# Patient Record
Sex: MALE | Race: OTHER | HISPANIC OR LATINO | URBAN - METROPOLITAN AREA
[De-identification: names, ages, dates, MRNs, and addresses within clinical notes are randomized per-mention and may not be internally consistent; named-entity substitution may affect disease eponyms.]

---

## 2018-01-29 ENCOUNTER — EMERGENCY (EMERGENCY)
Facility: HOSPITAL | Age: 33
LOS: 1 days | Discharge: HOME | End: 2018-01-29
Attending: EMERGENCY MEDICINE | Admitting: EMERGENCY MEDICINE
Payer: SELF-PAY

## 2018-01-29 VITALS
OXYGEN SATURATION: 100 % | HEART RATE: 80 BPM | RESPIRATION RATE: 18 BRPM | SYSTOLIC BLOOD PRESSURE: 118 MMHG | DIASTOLIC BLOOD PRESSURE: 72 MMHG | TEMPERATURE: 98 F

## 2018-01-29 VITALS
HEIGHT: 65 IN | SYSTOLIC BLOOD PRESSURE: 115 MMHG | RESPIRATION RATE: 18 BRPM | DIASTOLIC BLOOD PRESSURE: 78 MMHG | HEART RATE: 87 BPM | WEIGHT: 134.04 LBS | OXYGEN SATURATION: 100 % | TEMPERATURE: 98 F

## 2018-01-29 LAB
ALBUMIN SERPL ELPH-MCNC: 4.2 G/DL — SIGNIFICANT CHANGE UP (ref 3.3–5)
ALP SERPL-CCNC: 122 U/L — HIGH (ref 40–120)
ALT FLD-CCNC: 37 U/L RC — SIGNIFICANT CHANGE UP (ref 10–45)
ANION GAP SERPL CALC-SCNC: 10 MMOL/L — SIGNIFICANT CHANGE UP (ref 5–17)
AST SERPL-CCNC: 41 U/L — HIGH (ref 10–40)
BASOPHILS # BLD AUTO: 0.1 K/UL — SIGNIFICANT CHANGE UP (ref 0–0.2)
BASOPHILS NFR BLD AUTO: 1.1 % — SIGNIFICANT CHANGE UP (ref 0–2)
BILIRUB SERPL-MCNC: 0.3 MG/DL — SIGNIFICANT CHANGE UP (ref 0.2–1.2)
BUN SERPL-MCNC: 14 MG/DL — SIGNIFICANT CHANGE UP (ref 7–23)
CALCIUM SERPL-MCNC: 9.2 MG/DL — SIGNIFICANT CHANGE UP (ref 8.4–10.5)
CHLORIDE SERPL-SCNC: 103 MMOL/L — SIGNIFICANT CHANGE UP (ref 96–108)
CK MB BLD-MCNC: 1.5 % — SIGNIFICANT CHANGE UP (ref 0–3.5)
CK MB CFR SERPL CALC: 1.8 NG/ML — SIGNIFICANT CHANGE UP (ref 0–6.7)
CK SERPL-CCNC: 118 U/L — SIGNIFICANT CHANGE UP (ref 30–200)
CO2 SERPL-SCNC: 25 MMOL/L — SIGNIFICANT CHANGE UP (ref 22–31)
CREAT SERPL-MCNC: 0.74 MG/DL — SIGNIFICANT CHANGE UP (ref 0.5–1.3)
EOSINOPHIL # BLD AUTO: 0.2 K/UL — SIGNIFICANT CHANGE UP (ref 0–0.5)
EOSINOPHIL NFR BLD AUTO: 3.3 % — SIGNIFICANT CHANGE UP (ref 0–6)
GAS PNL BLDV: SIGNIFICANT CHANGE UP
GLUCOSE SERPL-MCNC: 101 MG/DL — HIGH (ref 70–99)
HCT VFR BLD CALC: 43.3 % — SIGNIFICANT CHANGE UP (ref 39–50)
HGB BLD-MCNC: 15.6 G/DL — SIGNIFICANT CHANGE UP (ref 13–17)
LYMPHOCYTES # BLD AUTO: 2.3 K/UL — SIGNIFICANT CHANGE UP (ref 1–3.3)
LYMPHOCYTES # BLD AUTO: 34.2 % — SIGNIFICANT CHANGE UP (ref 13–44)
MCHC RBC-ENTMCNC: 36 GM/DL — SIGNIFICANT CHANGE UP (ref 32–36)
MCHC RBC-ENTMCNC: 36.3 PG — HIGH (ref 27–34)
MCV RBC AUTO: 101 FL — HIGH (ref 80–100)
MONOCYTES # BLD AUTO: 0.8 K/UL — SIGNIFICANT CHANGE UP (ref 0–0.9)
MONOCYTES NFR BLD AUTO: 11.3 % — SIGNIFICANT CHANGE UP (ref 2–14)
NEUTROPHILS # BLD AUTO: 3.4 K/UL — SIGNIFICANT CHANGE UP (ref 1.8–7.4)
NEUTROPHILS NFR BLD AUTO: 50.2 % — SIGNIFICANT CHANGE UP (ref 43–77)
PLATELET # BLD AUTO: 291 K/UL — SIGNIFICANT CHANGE UP (ref 150–400)
POTASSIUM SERPL-MCNC: 4.2 MMOL/L — SIGNIFICANT CHANGE UP (ref 3.5–5.3)
POTASSIUM SERPL-SCNC: 4.2 MMOL/L — SIGNIFICANT CHANGE UP (ref 3.5–5.3)
PROT SERPL-MCNC: 7.5 G/DL — SIGNIFICANT CHANGE UP (ref 6–8.3)
RBC # BLD: 4.29 M/UL — SIGNIFICANT CHANGE UP (ref 4.2–5.8)
RBC # FLD: 13.6 % — SIGNIFICANT CHANGE UP (ref 10.3–14.5)
SODIUM SERPL-SCNC: 138 MMOL/L — SIGNIFICANT CHANGE UP (ref 135–145)
TROPONIN T SERPL-MCNC: <0.01 NG/ML — SIGNIFICANT CHANGE UP (ref 0–0.06)
WBC # BLD: 6.8 K/UL — SIGNIFICANT CHANGE UP (ref 3.8–10.5)
WBC # FLD AUTO: 6.8 K/UL — SIGNIFICANT CHANGE UP (ref 3.8–10.5)

## 2018-01-29 PROCEDURE — 71046 X-RAY EXAM CHEST 2 VIEWS: CPT

## 2018-01-29 PROCEDURE — 82803 BLOOD GASES ANY COMBINATION: CPT

## 2018-01-29 PROCEDURE — 82947 ASSAY GLUCOSE BLOOD QUANT: CPT

## 2018-01-29 PROCEDURE — 99284 EMERGENCY DEPT VISIT MOD MDM: CPT | Mod: 25

## 2018-01-29 PROCEDURE — 82330 ASSAY OF CALCIUM: CPT

## 2018-01-29 PROCEDURE — 85014 HEMATOCRIT: CPT

## 2018-01-29 PROCEDURE — 83605 ASSAY OF LACTIC ACID: CPT

## 2018-01-29 PROCEDURE — 99285 EMERGENCY DEPT VISIT HI MDM: CPT

## 2018-01-29 PROCEDURE — 71046 X-RAY EXAM CHEST 2 VIEWS: CPT | Mod: 26

## 2018-01-29 PROCEDURE — 84132 ASSAY OF SERUM POTASSIUM: CPT

## 2018-01-29 PROCEDURE — 93005 ELECTROCARDIOGRAM TRACING: CPT

## 2018-01-29 PROCEDURE — 82553 CREATINE MB FRACTION: CPT

## 2018-01-29 PROCEDURE — 96374 THER/PROPH/DIAG INJ IV PUSH: CPT

## 2018-01-29 PROCEDURE — 82550 ASSAY OF CK (CPK): CPT

## 2018-01-29 PROCEDURE — 85027 COMPLETE CBC AUTOMATED: CPT

## 2018-01-29 PROCEDURE — 80053 COMPREHEN METABOLIC PANEL: CPT

## 2018-01-29 PROCEDURE — 84295 ASSAY OF SERUM SODIUM: CPT

## 2018-01-29 PROCEDURE — 82435 ASSAY OF BLOOD CHLORIDE: CPT

## 2018-01-29 PROCEDURE — 84484 ASSAY OF TROPONIN QUANT: CPT

## 2018-01-29 RX ORDER — KETOROLAC TROMETHAMINE 30 MG/ML
30 SYRINGE (ML) INJECTION ONCE
Qty: 0 | Refills: 0 | Status: DISCONTINUED | OUTPATIENT
Start: 2018-01-29 | End: 2018-01-29

## 2018-01-29 RX ADMIN — Medication 30 MILLIGRAM(S): at 09:42

## 2018-01-29 RX ADMIN — Medication 30 MILLIGRAM(S): at 10:17

## 2018-01-29 NOTE — ED PROVIDER NOTE - MEDICAL DECISION MAKING DETAILS
Used [employer]  ALICIA Diaz MD: Pt is a 31 y/o male with no sig PMH who p/w c/o cough x 25 days and 4 days of intermittent L sided CP. States that cough is productive with whitish-green sputum. Cough is worse when he lies down to sleep. He has intermittent, mild 1-2/10 L sided CP over the past 4 days, off and on, without exacerbating/alleviating factors. States he had no discomfort yesterday, and only has a very mild discomfort now. Denies: f/c, nightsweats, travel/trauma/immobilization, leg pain/swelling, hemoptysis, HA, dizziness, abd pain, back pain, n/v/d. Exam benign.  DDx: viral syndrome / cold, reactive airways, GERD, occult pna  Plan: basic labs, ekg, cxr Used [employer]  ALICIA Diaz MD: Pt is a 33 y/o male with no sig PMH who p/w c/o cough x 25 days and 4 days of intermittent L sided CP. States that cough is productive with whitish-green sputum. Cough is worse when he lies down to sleep. He has intermittent, mild 1-2/10 L sided CP over the past 4 days, off and on, without exacerbating/alleviating factors. States he had no discomfort yesterday, and only has a very mild discomfort now. Denies: f/c, nightsweats, travel/trauma/immobilization, leg pain/swelling, hemoptysis, HA, dizziness, abd pain, back pain, n/v/d. Exam benign.  DDx:  viral syndrome / cold, reactive airways, GERD, occult pna  Plan: basic labs, ekg, cxr

## 2018-01-29 NOTE — ED PROVIDER NOTE - OBJECTIVE STATEMENT
31 yo M no significant PMHx, no cardiac or family history presents to ED with 4 days L side chest pain and palpitations, worse when lying down, worse when coughing. Pt endorses cough x 25 days unresolved with OTC cough medicine. Pt denies SOB, abdominal pain, fever, chills, calf pain, recent surgery or trauma, recent travel, smoking, N/V/D.

## 2018-01-29 NOTE — ED PROVIDER NOTE - CARE PLAN
Principal Discharge DX:	Bronchitis  Assessment and plan of treatment:	- Take Tessalon Perles 100 mg 3 times per day (at pharmacy)  - Follow up with medicine clinic tomorrow (number provided)  - Return to ER if you experience worsening chest pain or sign of infection such as high fever

## 2018-01-29 NOTE — ED PROVIDER NOTE - PLAN OF CARE
- Take Tessalon Perles 100 mg 3 times per day (at pharmacy)  - Follow up with medicine clinic tomorrow (number provided)  - Return to ER if you experience worsening chest pain or sign of infection such as high fever

## 2018-01-29 NOTE — ED PROVIDER NOTE - PHYSICAL EXAMINATION
A&Ox3 no apparent distress. CN 2-12 grossly intact without focal neurologic defict. PERRLA, EOMI. Heart RRR no murmur. No chest wall tenderness. No LE edema b/l. No JVD. No peripheral edema. Lungs CTA b/l no wheezes, rhonchi, rales. Abdomen soft nontender nondistended. Peripheral pulses present and equal b/l. Head NCAT. Skin normal for race.

## 2018-01-29 NOTE — ED ADULT NURSE NOTE - OBJECTIVE STATEMENT
31 y/o male speaks Syriac, [employer] used by Dr. Diaz.  As per MD (per ) Pt is a 31 y/o male with no PMH, pt  c/o productive whitish-green cough x 25 days and  intermittent L sided CP x 4 days.  Cough is worse when he lies down to sleep. Mild intermittent left sided CP pain scale 2/10 over the past 4 days. States he had no discomfort yesterday, and only has a very mild discomfort now.  Denies: fever, chills,  night sweats, recent   travel, leg pain/swelling, dizziness, abd pain, back pain, n/v/d, headache.  No acute respiratory distress noted.